# Patient Record
Sex: FEMALE | Race: WHITE | NOT HISPANIC OR LATINO | Employment: FULL TIME | URBAN - METROPOLITAN AREA
[De-identification: names, ages, dates, MRNs, and addresses within clinical notes are randomized per-mention and may not be internally consistent; named-entity substitution may affect disease eponyms.]

---

## 2023-03-11 ENCOUNTER — HOSPITAL ENCOUNTER (EMERGENCY)
Facility: HOSPITAL | Age: 38
Discharge: HOME | End: 2023-03-11
Attending: EMERGENCY MEDICINE

## 2023-03-11 ENCOUNTER — APPOINTMENT (EMERGENCY)
Dept: RADIOLOGY | Facility: HOSPITAL | Age: 38
End: 2023-03-11

## 2023-03-11 VITALS
RESPIRATION RATE: 18 BRPM | DIASTOLIC BLOOD PRESSURE: 91 MMHG | BODY MASS INDEX: 22.73 KG/M2 | HEIGHT: 68 IN | SYSTOLIC BLOOD PRESSURE: 162 MMHG | HEART RATE: 83 BPM | OXYGEN SATURATION: 97 % | TEMPERATURE: 98 F | WEIGHT: 150 LBS
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
## 2023-03-11 DIAGNOSIS — S52.123A RADIAL HEAD FRACTURE: ICD-10-CM

## 2023-03-11 DIAGNOSIS — S53.106A ELBOW DISLOCATION: Primary | ICD-10-CM

## 2023-03-11 DIAGNOSIS — Z98.890 HISTORY OF CONSCIOUS SEDATION: ICD-10-CM

## 2023-03-11 RX ORDER — SENNOSIDES 8.6 MG
650 CAPSULE ORAL EVERY 8 HOURS PRN
Qty: 30 TABLET | Refills: 0 | Status: SHIPPED | OUTPATIENT
Start: 2023-03-11

## 2023-03-11 RX ORDER — OXYCODONE HYDROCHLORIDE 5 MG/1
5 TABLET ORAL EVERY 6 HOURS PRN
Qty: 14 TABLET | Refills: 0 | Status: SHIPPED | OUTPATIENT
Start: 2023-03-11 | End: 2023-03-21

## 2023-03-11 RX ORDER — OXYCODONE HYDROCHLORIDE 5 MG/1
5 TABLET ORAL EVERY 6 HOURS PRN
Qty: 14 TABLET | Refills: 0 | Status: SHIPPED | OUTPATIENT
Start: 2023-03-11 | End: 2023-03-11 | Stop reason: SDUPTHER

## 2023-03-11 RX ORDER — HYDROMORPHONE HCL/PF 1 MG/ML
1 SYRINGE (ML) INJECTION ONCE
Status: COMPLETED | OUTPATIENT
Start: 2023-03-11 | End: 2023-03-11

## 2023-03-11 RX ORDER — PROPOFOL 10 MG/ML
200 INJECTION, EMULSION INTRAVENOUS ONCE
Status: DISCONTINUED | OUTPATIENT
Start: 2023-03-11 | End: 2023-03-11 | Stop reason: HOSPADM

## 2023-03-11 RX ORDER — FENTANYL CITRATE 50 UG/ML
2 INJECTION, SOLUTION INTRAMUSCULAR; INTRAVENOUS ONCE
Status: COMPLETED | OUTPATIENT
Start: 2023-03-11 | End: 2023-03-11

## 2023-03-11 RX ORDER — SENNOSIDES 8.6 MG
650 CAPSULE ORAL EVERY 8 HOURS PRN
Qty: 30 TABLET | Refills: 0 | Status: SHIPPED | OUTPATIENT
Start: 2023-03-11 | End: 2023-03-11 | Stop reason: SDUPTHER

## 2023-03-11 RX ORDER — PROPOFOL 10 MG/ML
200 INJECTION, EMULSION INTRAVENOUS ONCE
Status: COMPLETED | OUTPATIENT
Start: 2023-03-11 | End: 2023-03-11

## 2023-03-11 RX ORDER — OXYCODONE HYDROCHLORIDE 5 MG/1
5 TABLET ORAL ONCE
Status: COMPLETED | OUTPATIENT
Start: 2023-03-11 | End: 2023-03-11

## 2023-03-11 RX ADMIN — HYDROMORPHONE HYDROCHLORIDE 1 MG: 1 INJECTION, SOLUTION INTRAMUSCULAR; INTRAVENOUS; SUBCUTANEOUS at 19:10

## 2023-03-11 RX ADMIN — HYDROMORPHONE HYDROCHLORIDE 1 MG: 1 INJECTION, SOLUTION INTRAMUSCULAR; INTRAVENOUS; SUBCUTANEOUS at 18:18

## 2023-03-11 RX ADMIN — PROPOFOL 60 MG: 10 INJECTION, EMULSION INTRAVENOUS at 20:04

## 2023-03-11 RX ADMIN — OXYCODONE HYDROCHLORIDE 5 MG: 5 TABLET ORAL at 21:03

## 2023-03-11 RX ADMIN — PROPOFOL 50 MG: 10 INJECTION, EMULSION INTRAVENOUS at 20:06

## 2023-03-12 NOTE — DISCHARGE INSTRUCTIONS
Follow-up with orthopedics in your area    Prescriptions are sent to [facility] in [facility] per your request

## 2023-03-12 NOTE — ED PROVIDER NOTES
History  Chief Complaint   Patient presents with   • Fall     Left elbow deformity      Patient is a 79-year-old female with a past medical history of of lupus arriving for evaluation of left elbow injury  Patient reports that she was snowboarding for the first time in many years  Patient states that she lost her balance, catching herself with her elbow on a rock  Patient denies any head neck, chest, abdomen, pelvis strike  Patient reports she was wearing helmet, denies any head strike, denies any LOC  Arrives with a left elbow deformity  Patient is neurovascularly intact, but is reporting pain  Patient reports pain with movement of fingers  Patient is unable to flex or extend her elbow at this time secondary to pain  Was provided with 200 mcg of fentanyl prehospital   Patient has no other signs of injury  Patient denies any shoulder pain  Patient reports distal humerus, and proximal radial ulnar pain  Patient does have swelling at the site  No skin wounds appreciated  Radial pulses intact left has less than 2-second capillary refill  Patient reports that she has no numbness or tingling in her distal fingertips  Reports that she is right-hand dominant, 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  None       Past Medical History:   Diagnosis Date   • History of rhabdomyolysis    • Lupus ([facility] [address] )        No past surgical history on file  No family history on file  I have reviewed and agree with the history as documented  E-Cigarette/Vaping     E-Cigarette/Vaping Substances          Review of Systems   Constitutional: Negative  HENT: Negative  Eyes: Negative  Respiratory: Negative  Cardiovascular: Negative  Gastrointestinal: Negative  Endocrine: Negative  Genitourinary: Negative  Skin: Negative  Allergic/Immunologic: Negative  Neurological: Negative  Hematological: Negative  Psychiatric/Behavioral: Negative      All other systems reviewed and are negative  Physical Exam  Physical Exam  Vitals and nursing note reviewed  Constitutional:       General: She is not in acute distress  Appearance: Normal appearance  She is normal weight  She is not ill-appearing or toxic-appearing  HENT:      Head: Normocephalic  Right Ear: External ear normal       Left Ear: External ear normal       Nose: Nose normal       Mouth/Throat:      Mouth: Mucous membranes are moist    Eyes:      Extraocular Movements: Extraocular movements intact  Conjunctiva/sclera: Conjunctivae normal       Pupils: Pupils are equal, round, and reactive to light  Cardiovascular:      Rate and Rhythm: Normal rate and regular rhythm  Pulses:           Radial pulses are 3+ on the right side and 3+ on the left side  Heart sounds: Normal heart sounds  Pulmonary:      Effort: Pulmonary effort is normal       Breath sounds: Normal breath sounds  Abdominal:      General: Abdomen is flat  Bowel sounds are normal       Palpations: Abdomen is soft  Musculoskeletal:         General: Swelling, tenderness and signs of injury present  Right shoulder: Normal       Left shoulder: Normal       Right upper arm: Normal       Left upper arm: Tenderness present  Right elbow: Normal       Left elbow: Decreased range of motion  Tenderness present in radial head, medial epicondyle and lateral epicondyle  Right forearm: Normal       Left forearm: Deformity, tenderness and bony tenderness present  Right wrist: Normal       Left wrist: Tenderness present  Arms:       Cervical back: Normal range of motion  Skin:     General: Skin is warm  Capillary Refill: Capillary refill takes less than 2 seconds  Neurological:      General: No focal deficit present  Mental Status: She is alert and oriented to person, place, and time  Mental status is at baseline     Psychiatric:         Mood and Affect: Mood normal          Vital Signs  ED Triage Vitals Temperature Pulse Respirations Blood Pressure SpO2   03/11/23 1818 03/11/23 1807 03/11/23 1807 03/11/23 1818 03/11/23 1807   98 °F (36 7 °C) 89 18 (!) 191/90 97 %      Temp Source Heart Rate Source Patient Position - Orthostatic VS BP Location FiO2 (%)   03/11/23 1818 03/11/23 1807 -- 03/11/23 1926 --   Tympanic Monitor  Left arm       Pain Score       03/11/23 1807       10 - Worst Possible Pain           Vitals:    03/11/23 2025 03/11/23 2030 03/11/23 2035 03/11/23 2040   BP: 137/85 149/95 147/93 162/91   Pulse: 88 90 84 83         Visual Acuity  Visual Acuity    Flowsheet Row Most Recent Value   L Pupil Size (mm) 3   R Pupil Size (mm) 3          ED Medications  Medications   fentanyl citrate (PF) (FOR EMS ONLY) 100 mcg/2 mL injection 200 mcg (0 mcg Does not apply Given to EMS 3/11/23 1807)   HYDROmorphone (DILAUDID) injection 1 mg (1 mg Intravenous Given 3/11/23 1818)   HYDROmorphone (DILAUDID) injection 1 mg (1 mg Intravenous Given 3/11/23 1910)   propofol (DIPRIVAN) 200 MG/20ML bolus injection 200 mg (50 mg Intravenous Given 3/11/23 2006)   propofol (DIPRIVAN) 200 MG/20ML bolus injection 200 mg (60 mg Intravenous Given 3/11/23 2004)   oxyCODONE (ROXICODONE) IR tablet 5 mg (5 mg Oral Given 3/11/23 2103)       Diagnostic Studies  Results Reviewed     None                 XR elbow 2 vw left   ED Interpretation by AFRICA Taylor (03/11 2040)   Successful reduction of elbow dislocation  Radial head fracture now apparent      Final Result by Adam Lara MD (03/12 1242)      Successful reduction of earlier dislocated left elbow joint  Intra-articular displaced fracture of the left radial head  Findings concur with the referring clinician's preliminary interpretation already in the patient's electronic health record          Workstation performed: [id number]         XR humerus LEFT   ED Interpretation by AFRICA Taylor (03/11 2040)   No obvious fracture      Final Result by Alessandro Mccall Lorri Alejandre MD (03/12 5890)      Posterior dislocation of the elbow  No visible fracture            Workstation performed: [id number]         XR elbow 3+ views LEFT   ED Interpretation by AFRICA Cosme (19/85 1915)   Dislocation of elbow      Final Result by Marianne Samuels MD (03/12 1120)      Posterior dislocation of the elbow  Suspected acute avulsion fracture but uncertain origin         Workstation performed: [id number]         XR forearm 2 views LEFT   ED Interpretation by Kiko Cosme (03/11 2043)   Elbow fracture with possible radial head fracture      Final Result by Marianne Samuels MD (03/12 0725)      Posterior elbow dislocation without visible fracture            Workstation performed: [id number]         XR wrist 3+ views LEFT   ED Interpretation by AFRICA Cosme (03/11 2041)   No acute fracture      Final Result by Marianne Samuels MD (03/12 0725)      No acute osseous abnormality        Workstation performed: [id number]                    Procedures  Orthopedic injury treatment    Date/Time: 3/11/2023 8:20 AM  Performed by: AFRICA Cosme  Authorized by: AFRICA Cosme     Patient Location:  ED  [place] Protocol:  Procedure performed by:  Risks and benefits: risks, benefits and alternatives were discussed  Consent given by: patient  Patient understanding: patient states understanding of the procedure being performed  Patient consent: the patient's understanding of the procedure matches consent given  Procedure consent: procedure consent matches procedure scheduled  Required items: required blood products, implants, devices, and special equipment available  Patient identity confirmed: verbally with patient and arm band      Injury location:  Elbow  Location details:  Left elbow  Injury type:  Dislocation  Neurovascular status: Neurovascularly intact    Distal perfusion: normal    Neurological function: normal    Range of motion: normal Local anesthesia used?: No    Sedation type:   Moderate (conscious) sedation (See separate Procedural Sedation form)  Manipulation performed?: Yes    Reduction method:  Traction and counter traction and flexion  Reduction method:  Traction and counter traction and flexion  Reduction method:  Traction and counter traction and flexion  Reduction method:  Traction and counter traction and flexion  Reduction method:  Traction and counter traction and flexion  Reduction method:  Traction and counter traction and flexion  Skeletal traction used?: Yes    Reduction successful?: Yes    Confirmation: Reduction confirmed by x-ray    Immobilization:  Splint and sling  Splint type:  Long arm  Supplies used:  Ortho-Glass  Neurovascular status: Neurovascularly intact    Distal perfusion: normal    Neurological function: normal    Range of motion: normal    Patient tolerance:  Patient tolerated the procedure well with no immediate complications    Procedural Sedation    Date/Time: 3/11/2023 8:20 AM  Performed by: AFRICA Smith  Authorized by: AFRICA Smith     Immediate pre-procedure details:     Reassessment: Patient reassessed immediately prior to procedure      Reviewed: vital signs and relevant labs/tests      Verified: bag valve mask available, emergency equipment available, intubation equipment available, IV patency confirmed, oxygen available and suction available    Procedure details (see MAR for exact dosages):     Sedation start time:  8/20/2022 8:32 PM    Preoxygenation:  Nasal cannula    Sedation:  Propofol    Intra-procedure monitoring:  Blood pressure monitoring, cardiac monitor, continuous capnometry, continuous pulse oximetry, frequent LOC assessments and frequent vital sign checks    Intra-procedure events: none      Sedation end time:  3/11/2023 8:33 PM    Total sedation time (minutes):  20  Post-procedure details:     Attendance: Constant attendance by certified staff until patient recovered Recovery: Patient returned to pre-procedure baseline      Post-sedation assessments completed and reviewed: post-procedure airway patency not reviewed, post-procedure cardiovascular function not reviewed, post-procedure hydration status not reviewed, post-procedure mental status not reviewed, post-procedure nausea and vomiting status not reviewed, pain score not reviewed, post-procedure respiratory function not reviewed and post-procedure temperature not reviewed      Patient is stable for discharge or admission: yes      Patient tolerance: Tolerated well, no immediate complications  Comments:      Procedural sedation performed by Dr Chasity Espinosa             ED Course                               SBIRT 20yo+    Flowsheet Row Most Recent Value   SBIRT (25 yo +)    In order to provide better care to our patients, we are screening all of our patients for alcohol and drug use  Would it be okay to ask you these screening questions? Yes Filed at: 03/11/2023 1814   Initial Alcohol Screen: US AUDIT-C     1  How often do you have a drink containing alcohol? 1 Filed at: 03/11/2023 1814   2  How many drinks containing alcohol do you have on a typical day you are drinking? 1 Filed at: 03/11/2023 1814   3a  Male UNDER 65: How often do you have five or more drinks on one occasion? 0 Filed at: 03/11/2023 1814   3b  FEMALE Any Age, or MALE 65+: How often do you have 4 or more drinks on one occassion? 0 Filed at: 03/11/2023 1814   Audit-C Score 2 Filed at: 03/11/2023 1814   HENRY: How many times in the past year have you    Used an illegal drug or used a prescription medication for non-medical reasons? Never Filed at: 03/11/2023 1814                    Medical Decision Making  Differential diagnosis includes proximal ulna fracture, radial fracture, elbow dislocation, distal humerus fracture  Check x-rays  Patient denies any other injuries, reporting that she caught herself by hitting her elbow on a rock    Patient denies any LOC, denies any head trauma  Patient denies any abdominal pain, chest wall pain  She has obvious deformity of her left elbow, but is neurovascularly intact as documented in HPI, and PE  No concern for neurovascular injury at this time  Analgesia provided, as this is an acute injury, with deformity, narcotics are appropriate at this time  Patient found to have a posterior dislocation of her left elbow  Patient has a possible radial head fracture on xray, but will confirm with postreduction film  Dr Kirsten Ordonez made aware of patient's dislocation and in agreement to perform conscious sedation to reduce  Indication for conscious sedation, and dislocation reduction of her elbow  Patient aware of pros and cons, and in agreement with procedure  Patient is not driving home, and her ride is a friend at the bedside  Patient had successful reduction of her left elbow dislocation, radial head fracture now apparent on x-ray  Patient neurovascularly intact after splinting  patient was placed in a posterior long-arm splint with a sling  Patient provided with x-ray copies as she is from out of town  Patient remains neurovascularly intact at time of discharge  Patient aware that she will need to follow-up with a orthopedist in her area  Discussed importance of follow-up as dislocations are often accompanied by ligamentous injuries, which are not visualized on x-ray  Patient provided with additional dose of pain medication prior to leaving due to time of departure  Patient aware of prescription at a 24-hour pharmacy of her choosing  PRICE discussed  Patient aware to treat splint like a cast   Discussed strict return precautions with patient  Patient tolerating PO at time of discharge  Discussed discharge aspects of procedural sedation  Discussed radial head fracture now more apparent  Reviewed reasons to return to ed    Patient verbalized understanding of diagnosis and agreement with discharge plan of care as well as understanding of reasons to return to ed  Amount and/or Complexity of Data Reviewed  Radiology: ordered and independent interpretation performed  Risk  OTC drugs  Prescription drug management  Disposition  Final diagnoses:   Elbow dislocation   Radial head fracture   History of conscious sedation   Snowboard accident, initial encounter     Time reflects when diagnosis was documented in both MDM as applicable and the Disposition within this note     Time User Action Codes Description Comment    3/11/2023  8:45 PM Luh Apgar Add [S53 106A] Elbow dislocation     3/11/2023  8:45 PM Luh Apgar Add [S52 123A] Radial head fracture     3/11/2023  8:47 PM Luh Apgar Add [K16 167] History of conscious sedation     3/11/2023  8:59 PM Luh Apgar Add [V00 318A] Snowboard accident, initial encounter       ED Disposition     ED Disposition   Discharge    Condition   Stable    Date/Time   Sat Mar 11, 2023  8:58 PM    Comment   Nayeli Montero discharge to home/self care                 Follow-up Information     Follow up With Specialties Details Why Contact Info Additional Information    Orthopedics  Schedule an appointment as soon as possible for a visit in 2 days For further evaluation of symptoms Please follow up with ortho in your area     [facility] Emergency Medicine Go to  If symptoms worsen [address]  [place] [id number]  [phone] [facility], [address] [facility], [address] [facility]          Discharge Medication List as of 3/11/2023  9:00 PM      CONTINUE these medications which have CHANGED    Details   acetaminophen (TYLENOL) 650 mg CR tablet Take 1 tablet (650 mg total) by mouth every 8 (eight) hours as needed for mild pain, Starting Sat 3/11/2023, Normal      oxyCODONE (ROXICODONE) 5 immediate release tablet Take 1 tablet (5 mg total) by mouth every 6 (six) hours as needed for moderate pain for up to 10 days Max Daily Amount: 20 mg, Starting Sat 3/11/2023, Until Tue 3/21/2023 at 2359, Normal             No discharge procedures on file      PDMP Review     None          ED Provider  Electronically Signed by           AFRICA Ziegler  03/12/23 2036